# Patient Record
Sex: FEMALE | Race: BLACK OR AFRICAN AMERICAN | NOT HISPANIC OR LATINO | URBAN - METROPOLITAN AREA
[De-identification: names, ages, dates, MRNs, and addresses within clinical notes are randomized per-mention and may not be internally consistent; named-entity substitution may affect disease eponyms.]

---

## 2024-07-29 ENCOUNTER — HOSPITAL ENCOUNTER (EMERGENCY)
Facility: HOSPITAL | Age: 24
Discharge: HOME | End: 2024-07-29

## 2024-07-29 VITALS
TEMPERATURE: 97.1 F | WEIGHT: 108 LBS | DIASTOLIC BLOOD PRESSURE: 74 MMHG | BODY MASS INDEX: 20.39 KG/M2 | HEART RATE: 73 BPM | RESPIRATION RATE: 16 BRPM | OXYGEN SATURATION: 99 % | SYSTOLIC BLOOD PRESSURE: 126 MMHG | HEIGHT: 61 IN

## 2024-07-29 DIAGNOSIS — U07.1 COVID: Primary | ICD-10-CM

## 2024-07-29 PROBLEM — D57.3 SICKLE CELL TRAIT (CMS-HCC): Status: ACTIVE | Noted: 2024-07-29

## 2024-07-29 PROBLEM — E55.9 VITAMIN D DEFICIENCY: Status: ACTIVE | Noted: 2024-07-29

## 2024-07-29 PROBLEM — L70.9 ACNE: Status: ACTIVE | Noted: 2024-07-29

## 2024-07-29 PROBLEM — M25.469 KNEE SWELLING: Status: ACTIVE | Noted: 2024-07-29

## 2024-07-29 PROBLEM — M79.605 PAIN OF LEFT LOWER EXTREMITY: Status: ACTIVE | Noted: 2024-07-29

## 2024-07-29 PROBLEM — D64.9 ANEMIA: Status: ACTIVE | Noted: 2024-07-29

## 2024-07-29 PROBLEM — D56.3 ALPHA THALASSEMIA MINOR TRAIT: Status: ACTIVE | Noted: 2024-07-29

## 2024-07-29 PROBLEM — M25.529 ELBOW PAIN: Status: ACTIVE | Noted: 2024-07-29

## 2024-07-29 PROBLEM — M22.40 CHONDROMALACIA OF PATELLA: Status: ACTIVE | Noted: 2024-07-29

## 2024-07-29 LAB
S PYO DNA THROAT QL NAA+PROBE: NOT DETECTED
SARS-COV-2 RNA RESP QL NAA+PROBE: DETECTED

## 2024-07-29 PROCEDURE — 87635 SARS-COV-2 COVID-19 AMP PRB: CPT | Performed by: PHYSICIAN ASSISTANT

## 2024-07-29 PROCEDURE — 87651 STREP A DNA AMP PROBE: CPT | Performed by: PHYSICIAN ASSISTANT

## 2024-07-29 PROCEDURE — 2500000002 HC RX 250 W HCPCS SELF ADMINISTERED DRUGS (ALT 637 FOR MEDICARE OP, ALT 636 FOR OP/ED): Performed by: PHYSICIAN ASSISTANT

## 2024-07-29 PROCEDURE — 99283 EMERGENCY DEPT VISIT LOW MDM: CPT

## 2024-07-29 PROCEDURE — 2500000001 HC RX 250 WO HCPCS SELF ADMINISTERED DRUGS (ALT 637 FOR MEDICARE OP): Performed by: PHYSICIAN ASSISTANT

## 2024-07-29 RX ORDER — ACETAMINOPHEN 325 MG/1
975 TABLET ORAL ONCE
Status: COMPLETED | OUTPATIENT
Start: 2024-07-29 | End: 2024-07-29

## 2024-07-29 RX ORDER — IBUPROFEN 600 MG/1
600 TABLET ORAL ONCE
Status: COMPLETED | OUTPATIENT
Start: 2024-07-29 | End: 2024-07-29

## 2024-07-29 RX ORDER — TIZANIDINE 2 MG/1
2 TABLET ORAL EVERY 8 HOURS PRN
Qty: 4 TABLET | Refills: 0 | Status: SHIPPED | OUTPATIENT
Start: 2024-07-29

## 2024-07-29 RX ORDER — TIZANIDINE 4 MG/1
4 TABLET ORAL ONCE
Status: COMPLETED | OUTPATIENT
Start: 2024-07-29 | End: 2024-07-29

## 2024-07-29 RX ORDER — IBUPROFEN 600 MG/1
600 TABLET ORAL EVERY 6 HOURS PRN
Qty: 20 TABLET | Refills: 0 | Status: SHIPPED | OUTPATIENT
Start: 2024-07-29

## 2024-07-29 ASSESSMENT — COLUMBIA-SUICIDE SEVERITY RATING SCALE - C-SSRS
1. IN THE PAST MONTH, HAVE YOU WISHED YOU WERE DEAD OR WISHED YOU COULD GO TO SLEEP AND NOT WAKE UP?: NO
6. HAVE YOU EVER DONE ANYTHING, STARTED TO DO ANYTHING, OR PREPARED TO DO ANYTHING TO END YOUR LIFE?: NO
2. HAVE YOU ACTUALLY HAD ANY THOUGHTS OF KILLING YOURSELF?: NO

## 2024-07-29 ASSESSMENT — PAIN - FUNCTIONAL ASSESSMENT: PAIN_FUNCTIONAL_ASSESSMENT: 0-10

## 2024-07-29 ASSESSMENT — PAIN SCALES - GENERAL
PAINLEVEL_OUTOF10: 5 - MODERATE PAIN
PAINLEVEL_OUTOF10: 8

## 2024-07-29 ASSESSMENT — PAIN DESCRIPTION - PROGRESSION: CLINICAL_PROGRESSION: NOT CHANGED

## 2024-07-29 NOTE — ED TRIAGE NOTES
Generalized body aches inc headache, sore throat since this morning. Just returned from [place], tried over the counter meds with no relief

## 2024-07-29 NOTE — DISCHARGE INSTRUCTIONS
You have been diagnosed with COVID.  Please follow all CDC guidelines regarding masking and isolation.  Stay well-hydrated.  Get plenty of rest.  Continue over-the-counter medications such as Tylenol and ibuprofen.  May use Mucinex, Sudafed, Robitussin, NyQuil or DayQuil.  COVID is a virus.  May take up to 2 to 3 weeks before feel back to normal.  Follow-up with primary care provider within the next 2 to 5 days and return to ER for new or worsening symptoms

## 2024-07-29 NOTE — ED PROVIDER NOTES
"Chief Complaint   Patient presents with    Generalized Body Aches     HPI:   Claudia Larios is an 23 y.o. female with history of sickle cell trait and alpha thalassemia the presents to the ED for evaluation of multiple viral type symptoms that started yesterday.  She endorses whole body aches, headache, sore throat, \"my bones hurt\", cough, nasal congestion.  Symptoms started today.  She tried Tylenol, hot tea, hot shower and heating pad with no relief.  She says she recently flew to [place] but otherwise denies sick contacts.  LMP early July does not remember exact date.  Denies chance of pregnancy.  She denies chest pain, palpitations, shortness of breath, nausea, vomiting, diarrhea, dysuria, urinary urgency or frequency, vaginal discharge.    Medications: Denies any  Soc HX: Occasional alcohol and marijuana  No Known Allergies: NKDA  History reviewed. No pertinent past medical history.  History reviewed. No pertinent surgical history.  No family history on file.     Physical Exam  Vitals and nursing note reviewed.   Constitutional:       General: She is not in acute distress.     Appearance: Normal appearance. She is not ill-appearing or toxic-appearing.   HENT:      Right Ear: Tympanic membrane, ear canal and external ear normal.      Left Ear: Tympanic membrane, ear canal and external ear normal.      Ears:      Comments: No mastoid tenderness no swelling     Nose: Congestion and rhinorrhea present.      Mouth/Throat:      Mouth: Mucous membranes are moist.      Pharynx: Posterior oropharyngeal erythema present.      Comments: Uvula midline.  Tonsils without exudate  Eyes:      Pupils: Pupils are equal, round, and reactive to light.   Cardiovascular:      Rate and Rhythm: Normal rate and regular rhythm.      Pulses: Normal pulses.      Heart sounds: Normal heart sounds. No murmur heard.  Pulmonary:      Effort: Pulmonary effort is normal. No respiratory distress.      Breath sounds: Normal breath sounds. "   Abdominal:      General: Bowel sounds are normal.      Palpations: Abdomen is soft.      Tenderness: There is no abdominal tenderness. There is no guarding or rebound.   Musculoskeletal:         General: Normal range of motion.      Cervical back: Normal range of motion.   Lymphadenopathy:      Cervical: Cervical adenopathy present.   Skin:     General: Skin is warm and dry.      Capillary Refill: Capillary refill takes less than 2 seconds.   Neurological:      Mental Status: She is alert.      Cranial Nerves: No cranial nerve deficit.      VS: As documented in the triage note and EMR flowsheet from this visit were reviewed.      Medical Decision Making:   ED Course as of 07/29/24 0329 Mon Jul 29, 2024   0301 Vitals Reviewed: Afebrile. Normotensive. Not tachycardic nor tachypneic. No hypoxia.   [KA]   0323 Patient is 23-year-old female presents to the ED for multiple viral type symptoms concerning for viral illness such as COVID.  On my exam her heart rate is regular.  Lung sounds are normal.  Oropharynx is injected without tonsillar edema nor exudate.  Bilateral TMs normal.  No mastoid tenderness nor swelling.  Bilateral anterior cervical lymphadenopathy.  Abdomen is soft and nontender.  Symmetric distal pulses.  COVID and strep obtained.  Patient given Tylenol, ibuprofen and tizanidine.  COVID is positive.  Recommended patient continue supportive care at home including rest, hydration, OTC meds.  No chronic medical comorbidities that necessitate Paxlovid.  Advised patient follow all CDC guidelines and protocol regarding COVID masking and isolation.  Encouraged her to follow-up with primary care provider within the next 2 to 5 days and return to ER for any new or worsening symptoms [KA]      ED Course User Index  [KA] Susanne Truong PA-C         Diagnoses as of 07/29/24 0329   COVID      Escalation of Care: Appropriate for outpatient management     Counseling: Spoke with the patient and discussed today´s  findings, in addition to providing specific details for the plan of care and expected course.  Patient was given the opportunity to ask questions.    Discussed return precautions and importance of follow-up.  Advised to follow-up with PCP.  Advised to return to the ED for changing or worsening symptoms, new symptoms, complaint specific precautions, and precautions listed on the discharge paperwork.  Educated on the common potential side effects of medications prescribed.    I advised the patient that the emergency evaluation and treatment provided today doesn't end their need for medical care. It is very important that they follow-up with their primary care provider or other specialist as instructed.    The plan of care was mutually agreed upon with the patient. The patient and/or family were given the opportunity to ask questions. All questions asked today in the ED were answered to the best of my ability with today's information.    I specifically advised the patient to return to the ED for changing or worsening symptoms, worrisome new symptoms, or for any complaint specific precautions listed on the discharge paperwork.    This patient was cared for in the setting of nationwide stress on resources and staffing.    This report was transcribed using voice recognition software.  Every effort was made to ensure accuracy, however, inadvertently computerized transcription errors may be present.       Susanne Truong PA-C  07/29/24 0281

## 2024-07-29 NOTE — Clinical Note
Claudia Larios was seen and treated in our emergency department on 7/29/2024.  She may return to work on 08/02/2024.       If you have any questions or concerns, please don't hesitate to call.      Susanne Truong PA-C